# Patient Record
Sex: FEMALE | Race: WHITE | NOT HISPANIC OR LATINO | Employment: UNEMPLOYED | URBAN - METROPOLITAN AREA
[De-identification: names, ages, dates, MRNs, and addresses within clinical notes are randomized per-mention and may not be internally consistent; named-entity substitution may affect disease eponyms.]

---

## 2017-03-09 DIAGNOSIS — D12.6 ADENOMATOUS COLON POLYP: Primary | ICD-10-CM

## 2017-06-05 ENCOUNTER — ANESTHESIA (OUTPATIENT)
Dept: GASTROENTEROLOGY | Facility: HOSPITAL | Age: 57
End: 2017-06-05

## 2017-06-05 ENCOUNTER — HOSPITAL ENCOUNTER (OUTPATIENT)
Facility: HOSPITAL | Age: 57
Setting detail: HOSPITAL OUTPATIENT SURGERY
Discharge: HOME | End: 2017-06-05
Attending: INTERNAL MEDICINE | Admitting: INTERNAL MEDICINE

## 2017-06-05 ENCOUNTER — ANESTHESIA EVENT (OUTPATIENT)
Dept: GASTROENTEROLOGY | Facility: HOSPITAL | Age: 57
End: 2017-06-05

## 2017-06-05 VITALS
HEART RATE: 63 BPM | TEMPERATURE: 97.8 F | RESPIRATION RATE: 16 BRPM | DIASTOLIC BLOOD PRESSURE: 77 MMHG | BODY MASS INDEX: 18.79 KG/M2 | WEIGHT: 124 LBS | HEIGHT: 68 IN | OXYGEN SATURATION: 99 % | SYSTOLIC BLOOD PRESSURE: 125 MMHG

## 2017-06-05 DIAGNOSIS — D12.6 ADENOMATOUS COLON POLYP: ICD-10-CM

## 2017-06-05 PROCEDURE — 25010000002 PROPOFOL 10 MG/ML EMULSION: Performed by: ANESTHESIOLOGY

## 2017-06-05 PROCEDURE — 88305 TISSUE EXAM BY PATHOLOGIST: CPT | Performed by: INTERNAL MEDICINE

## 2017-06-05 PROCEDURE — S0260 H&P FOR SURGERY: HCPCS | Performed by: INTERNAL MEDICINE

## 2017-06-05 PROCEDURE — 45385 COLONOSCOPY W/LESION REMOVAL: CPT | Performed by: INTERNAL MEDICINE

## 2017-06-05 PROCEDURE — 45380 COLONOSCOPY AND BIOPSY: CPT | Performed by: INTERNAL MEDICINE

## 2017-06-05 RX ORDER — PROPOFOL 10 MG/ML
VIAL (ML) INTRAVENOUS AS NEEDED
Status: DISCONTINUED | OUTPATIENT
Start: 2017-06-05 | End: 2017-06-05 | Stop reason: SURG

## 2017-06-05 RX ORDER — LIDOCAINE HYDROCHLORIDE 20 MG/ML
INJECTION, SOLUTION INFILTRATION; PERINEURAL AS NEEDED
Status: DISCONTINUED | OUTPATIENT
Start: 2017-06-05 | End: 2017-06-05 | Stop reason: SURG

## 2017-06-05 RX ORDER — TOPIRAMATE 100 MG/1
50 TABLET, FILM COATED ORAL 2 TIMES DAILY
COMMUNITY
End: 2017-11-26

## 2017-06-05 RX ORDER — ESCITALOPRAM OXALATE 10 MG/1
30 TABLET ORAL DAILY
COMMUNITY

## 2017-06-05 RX ORDER — SODIUM CHLORIDE, SODIUM LACTATE, POTASSIUM CHLORIDE, CALCIUM CHLORIDE 600; 310; 30; 20 MG/100ML; MG/100ML; MG/100ML; MG/100ML
30 INJECTION, SOLUTION INTRAVENOUS CONTINUOUS PRN
Status: DISCONTINUED | OUTPATIENT
Start: 2017-06-05 | End: 2017-06-05 | Stop reason: HOSPADM

## 2017-06-05 RX ADMIN — LIDOCAINE HYDROCHLORIDE 100 MG: 20 INJECTION, SOLUTION INFILTRATION; PERINEURAL at 14:35

## 2017-06-05 RX ADMIN — PROPOFOL 200 MG: 10 INJECTION, EMULSION INTRAVENOUS at 14:35

## 2017-06-05 RX ADMIN — SODIUM CHLORIDE, POTASSIUM CHLORIDE, SODIUM LACTATE AND CALCIUM CHLORIDE 30 ML/HR: 600; 310; 30; 20 INJECTION, SOLUTION INTRAVENOUS at 13:48

## 2017-06-05 RX ADMIN — SODIUM CHLORIDE, POTASSIUM CHLORIDE, SODIUM LACTATE AND CALCIUM CHLORIDE: 600; 310; 30; 20 INJECTION, SOLUTION INTRAVENOUS at 14:30

## 2017-06-05 NOTE — PLAN OF CARE
Problem: Patient Care Overview (Adult)  Goal: Plan of Care Review  Outcome: Ongoing (interventions implemented as appropriate)    06/05/17 1323   Coping/Psychosocial Response Interventions   Plan Of Care Reviewed With patient       Goal: Adult Individualization and Mutuality  Outcome: Ongoing (interventions implemented as appropriate)  Goal: Discharge Needs Assessment  Outcome: Ongoing (interventions implemented as appropriate)    06/05/17 1323   Living Environment   Transportation Available car   Discharge Needs Assessment   Discharge Disposition home or self-care         Problem: GI Endoscopy (Adult)  Goal: Signs and Symptoms of Listed Potential Problems Will be Absent or Manageable (GI Endoscopy)  Outcome: Ongoing (interventions implemented as appropriate)

## 2017-06-05 NOTE — ANESTHESIA POSTPROCEDURE EVALUATION
Patient: Maggi De Santiago    Procedure Summary     Date Anesthesia Start Anesthesia Stop Room / Location    06/05/17 0363 [id number]  [facility]       Procedure Diagnosis Surgeon Provider    COLONOSCOPY TO CECUM AND TERMINAL ILEUM  WITH HOT AND COLD POLYPECTOMIES (N/A ) Adenomatous colon polyp  (Adenomatous colon polyp [D12.6]) MD Van Jensen MD          Anesthesia Type: MAC  Last vitals  BP      Temp      Pulse     Resp      SpO2        Post Anesthesia Care and Evaluation    Patient location during evaluation: PACU  Patient participation: complete - patient participated  Level of consciousness: awake and alert  Pain management: adequate  Airway patency: patent  Anesthetic complications: No anesthetic complications    Cardiovascular status: acceptable  Respiratory status: acceptable  Hydration status: acceptable

## 2017-06-05 NOTE — H&P
"[facility]  Pre Procedure History & Physical    Chief Complaint:   Hx adenomatous polyps, FH colon polyps    Subjective     HPI:   58 yo for surveillance c/s.  2 mm tubular adenoma removed from cecum in 2011. Her mother had colon polyps.  She denies any change in her bowel habits or blood in stool.    Past Medical History:   History reviewed. No pertinent past medical history.    Past Surgical History:  Past Surgical History:   Procedure Laterality Date   • BREAST BIOPSY Right    • MOUTH SURGERY      grafting   • TONSILLECTOMY         Family History:  History reviewed. No pertinent family history.    Social History:   reports that she has quit smoking. She does not have any smokeless tobacco history on file. She reports that she drinks alcohol.    Medications:   Prescriptions Prior to Admission   Medication Sig Dispense Refill Last Dose   • escitalopram (LEXAPRO) 10 MG tablet Take 30 mg by mouth Daily.   6/4/2017 at Unknown time   • Multiple Vitamin (MULTI VITAMIN PO) Take  by mouth Daily.   6/4/2017 at Unknown time   • Multiple Vitamins-Minerals (MULTIMINERAL PLUS PO) Take  by mouth Daily.   6/4/2017 at Unknown time   • topiramate (TOPAMAX) 100 MG tablet Take 50 mg by mouth 2 (Two) Times a Day.   6/5/2017 at Unknown time       Allergies:  Codeine    ROS:    Pertinent items are noted in HPI, all other systems reviewed and negative     Objective     Blood pressure 121/82, pulse 65, temperature 97.8 °F (36.6 °C), temperature source Oral, resp. rate 14, height 67.5\" (171.5 cm), weight 124 lb (56.2 kg), SpO2 99 %.    Physical Exam   Constitutional: Pt is oriented to person, place, and time and well-developed, well-nourished, and in no distress.   Mouth/Throat: Oropharynx is clear and moist.   Neck: Normal range of motion.   Cardiovascular: Normal rate, regular rhythm   Pulmonary/Chest: Effort normal   Abdominal: Soft. Nontender  Skin: Skin is warm and dry.   Psychiatric: Mood, memory, affect and " judgment normal.     Assessment/Plan     Diagnosis:  Hx adenomatous polyps, FH colon polyps    Anticipated Surgical Procedure:  colonoscopy    The risks, benefits, and alternatives of this procedure have been discussed with the patient or the responsible party- the patient understands and agrees to proceed.

## 2017-06-05 NOTE — ANESTHESIA PREPROCEDURE EVALUATION
Anesthesia Evaluation     Patient summary reviewed and Nursing notes reviewed   NPO Solid Status: > 8 hours  NPO Liquid Status: > 8 hours     Airway   Mallampati: II  TM distance: >3 FB  Neck ROM: full  no difficulty expected  Dental - normal exam     Pulmonary - normal exam   (+) a smoker Former,   Cardiovascular - negative cardio ROS and normal exam        Neuro/Psych- negative ROS  GI/Hepatic/Renal/Endo - negative ROS     Musculoskeletal (-) negative ROS    Abdominal  - normal exam   Substance History - negative use     OB/GYN          Other - negative ROS                                       Anesthesia Plan    ASA 2     MAC     intravenous induction   Anesthetic plan and risks discussed with patient.

## 2017-06-06 LAB
CYTO UR: NORMAL
LAB AP CASE REPORT: NORMAL
Lab: NORMAL
PATH REPORT.FINAL DX SPEC: NORMAL
PATH REPORT.GROSS SPEC: NORMAL

## 2017-06-14 ENCOUNTER — TELEPHONE (OUTPATIENT)
Dept: GASTROENTEROLOGY | Facility: CLINIC | Age: 57
End: 2017-06-14

## 2017-06-14 NOTE — TELEPHONE ENCOUNTER
Called pt and advised per Dr Bird that one of the polyps showed adenomatous change.  This is not cancerous but is considered potentially precancerous and she recommends a repeat c/s in 5 yrs.  Pt verb understanding.     C/s placed in recall for 06/05/2022.

## 2017-11-26 ENCOUNTER — APPOINTMENT (OUTPATIENT)
Dept: GENERAL RADIOLOGY | Facility: HOSPITAL | Age: 57
End: 2017-11-26

## 2017-11-26 ENCOUNTER — HOSPITAL ENCOUNTER (EMERGENCY)
Facility: HOSPITAL | Age: 57
Discharge: HOME | End: 2017-11-26
Attending: EMERGENCY MEDICINE | Admitting: EMERGENCY MEDICINE

## 2017-11-26 VITALS
SYSTOLIC BLOOD PRESSURE: 136 MMHG | WEIGHT: 130 LBS | HEIGHT: 67 IN | DIASTOLIC BLOOD PRESSURE: 91 MMHG | RESPIRATION RATE: 16 BRPM | HEART RATE: 80 BPM | TEMPERATURE: 98.6 F | BODY MASS INDEX: 20.4 KG/M2 | OXYGEN SATURATION: 100 %

## 2017-11-26 DIAGNOSIS — S52.124A CLOSED NONDISPLACED FRACTURE OF HEAD OF RIGHT RADIUS, INITIAL ENCOUNTER: Primary | ICD-10-CM
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
## 2017-11-26 DIAGNOSIS — S52.561A CLOSED VOLAR BARTON'S FRACTURE OF RIGHT RADIUS, INITIAL ENCOUNTER: ICD-10-CM

## 2017-11-26 PROCEDURE — 73090 X-RAY EXAM OF FOREARM: CPT

## 2017-11-26 PROCEDURE — 73130 X-RAY EXAM OF HAND: CPT

## 2017-11-26 PROCEDURE — 73070 X-RAY EXAM OF ELBOW: CPT

## 2017-11-26 PROCEDURE — 99282 EMERGENCY DEPT VISIT SF MDM: CPT

## 2017-11-26 PROCEDURE — 73110 X-RAY EXAM OF WRIST: CPT

## 2017-11-26 RX ORDER — ONDANSETRON 2 MG/ML
4 INJECTION INTRAMUSCULAR; INTRAVENOUS ONCE
Status: DISCONTINUED | OUTPATIENT
Start: 2017-11-26 | End: 2017-11-26

## 2017-11-26 RX ORDER — HYDROCODONE BITARTRATE AND ACETAMINOPHEN 5; 325 MG/1; MG/1
1 TABLET ORAL EVERY 6 HOURS PRN
Qty: 12 TABLET | Refills: 0 | Status: SHIPPED | OUTPATIENT
Start: 2017-11-26

## 2017-11-26 RX ORDER — ONDANSETRON 4 MG/1
4 TABLET, FILM COATED ORAL EVERY 6 HOURS
Qty: 8 TABLET | Refills: 0 | Status: SHIPPED | OUTPATIENT
Start: 2017-11-26

## 2017-11-26 NOTE — ED PROVIDER NOTES
"EMERGENCY DEPARTMENT ENCOUNTER    Room Number:  08/08  Date seen:  11/26/2017  Time seen: 5:57 PM  PCP: Thelma Mcintosh MD    HPI:  Chief complaint: R arm pain  Context:Maggi De Santiago is a 57 y.o. female who presents to the ED with c/o R arm pain s/p trip and fall onto the sidewalk just PTA. Pt also c/o R hand pain and abrasions. Pt denies head injury, LOC, dizziness, or lightheadedness.     Timing: constant   Duration: s/p fall earlier today   Location: R arm   Radiation: none stated  Quality: pain   Intensity/Severity: moderate   Associated Symptoms: R hand pain, R hand abrasion  Aggravating Factors: none stated   Alleviating Factors: none stated   Previous Episodes: none   Treatment before arrival: none     MEDICAL RECORD REVIEW    ALLERGIES  Codeine    PAST MEDICAL HISTORY  Active Ambulatory Problems     Diagnosis Date Noted   • No Active Ambulatory Problems     Resolved Ambulatory Problems     Diagnosis Date Noted   • No Resolved Ambulatory Problems     Past Medical History:   Diagnosis Date   • Depression        PAST SURGICAL HISTORY  Past Surgical History:   Procedure Laterality Date   • BREAST BIOPSY Right    • COLONOSCOPY N/A 6/5/2017    Procedure: COLONOSCOPY TO CECUM AND TERMINAL ILEUM  WITH HOT AND COLD POLYPECTOMIES;  Surgeon: Lashell Bird MD;  Location: [facility];  Service:    • MOUTH SURGERY      grafting   • TONSILLECTOMY         FAMILY HISTORY  History reviewed. No pertinent family history.    SOCIAL HISTORY  Social History     Social History   • Marital status:      Spouse name: N/A   • Number of children: N/A   • Years of education: N/A     Occupational History   • Not on file.     Social History Main Topics   • Smoking status: Former Smoker   • Smokeless tobacco: Not on file      Comment: \"in college\"   • Alcohol use Yes      Comment: daily   • Drug use: No   • Sexual activity: Defer     Other Topics Concern   • Not on file     Social History Narrative   • No narrative on " file       REVIEW OF SYSTEMS  Review of Systems   Constitutional: Negative for activity change, appetite change, diaphoresis and fever.   HENT: Negative for trouble swallowing.    Eyes: Negative for visual disturbance.   Respiratory: Negative for cough, chest tightness, shortness of breath and wheezing.    Cardiovascular: Negative for chest pain, palpitations and leg swelling.   Gastrointestinal: Negative for abdominal pain, diarrhea, nausea and vomiting.   Genitourinary: Negative for dysuria.   Musculoskeletal: Positive for arthralgias (R arm, R hand). Negative for back pain.   Skin: Positive for wound (abrasion, R hand). Negative for rash.   Neurological: Negative for dizziness, speech difficulty and light-headedness.       PHYSICAL EXAM  ED Triage Vitals   Temp Heart Rate Resp BP SpO2   11/26/17 1626 11/26/17 1626 11/26/17 1641 11/26/17 1641 11/26/17 1626   98.6 °F (37 °C) 80 16 156/97 100 %      Temp src Heart Rate Source Patient Position BP Location FiO2 (%)   11/26/17 1626 11/26/17 1626 11/26/17 1641 11/26/17 1641 --   Tympanic Monitor Lying Right arm      Physical Exam   Constitutional: She is oriented to person, place, and time and well-developed, well-nourished, and in no distress. No distress.   HENT:   Head: Normocephalic and atraumatic.   Mouth/Throat: Uvula is midline and mucous membranes are normal.   Neck: Normal range of motion. Neck supple.   Cardiovascular: Normal rate, regular rhythm, S1 normal, S2 normal and normal heart sounds.  Exam reveals no gallop and no friction rub.    No murmur heard.  Pulses:       Radial pulses are 2+ on the right side, and 2+ on the left side.   Brisk capillary refill   Pulmonary/Chest: Effort normal and breath sounds normal. She has no decreased breath sounds. She has no wheezes. She has no rhonchi. She has no rales.   Abdominal: Soft. Normal appearance. There is no rebound and no guarding.   Musculoskeletal: Normal range of motion.   Pt cannot fully extend R elbow.  No clicking or locking.   Neurological: She is alert and oriented to person, place, and time.   Skin: Skin is warm and dry. Abrasion (scattered, dorsum of R hand) noted.   Psychiatric: Affect and judgment normal.   Nursing note and vitals reviewed.    I ordered the above labs and reviewed the results    RADIOLOGY  XR Elbow 2 View Right   Final Result   AP and lateral view of the right elbow demonstrates a sail  sign. A mildly comminuted radial head fracture is identified with  approximately 3 mm in maximum displacement.   XR Wrist 3+ View Right   Final Result   Four views of the right wrist show no evidence of fracture  or dislocation.   XR Forearm 2 View Right   Final Result   Two views of the right forearm demonstrates a proximal  radial head fracture. No other fracture is identified.   XR Hand 3+ View Right   Preliminary Result   Subtle fracture of the volar distal radius cannot be excluded. Soft   tissue swelling is present.    If indicated CT scan may be performed.              I ordered the above noted radiological studies and reviewed the images on the PACS system.    MEDICATIONS GIVEN IN ER  Medications - No data to display    PROCEDURES  Procedures    COURSE & MEDICAL DECISION MAKING  Pertinent Imaging studies that were ordered and reviewed are noted above.  Results were reviewed/discussed with the patient and they were also made aware of online assess.     PROGRESS AND CONSULTS    Progress Notes:    ED Course   1831  Per the RN, the pt does not want pain medication.     1840  Rechecked pt, who is resting comfortably. Discussed the pt's XR findings of a radial head fracture. Plan to d/c the pt with meds for pain and nausea. Pt understands and agrees with the plan, and all questions were answered.     1856  Reviewed pt's history and workup with Dr. Montanez.  After a bedside evaluation; Dr. Montanez agrees with the plan of care.    1931  Rechecked pt, who is resting comfortably. Discussed the pt's XR results  "including a subtle volar distal radius fracture. Plan to d/c the pt in a wrist splint, sling, and swath, with pain meds, and a f/u with an orthopedist or hand surgeon. Pt understands and agrees with the plan, and all questions were answered.     1934  The patient's history, physical exam, and lab findings were discussed with the physician, who also performed a face to face history and physical exam.  I discussed all results and noted any abnormalities with patient.  Discussed absoute need to recheck abnormalities with their family physician.  I answered any of the patient's questions.  Discussed plan for discharge, as there is no emergent indication for admission.  Pt is agreeable and understands need for follow up and repeat testing.  Pt is aware that discharge does not mean that nothing is wrong but it indicates no emergency is present and they must continue care with their family physician.  Pt is discharged with instructions to follow up with primary care doctor to have their blood pressure rechecked.       Disposition vitals:  /97 (BP Location: Right arm, Patient Position: Lying)  Pulse 80  Temp 98.6 °F (37 °C) (Tympanic)   Resp 16  Ht 67\" (170.2 cm)  Wt 130 lb (59 kg)  SpO2 100%  BMI 20.36 kg/m2      DIAGNOSIS  Final diagnoses:   Closed nondisplaced fracture of head of right radius, initial encounter   Closed volar Hwang's fracture of right radius, initial encounter   Fall (on)(from) sidewalk curb, initial encounter       FOLLOW UP   Orlando Lewis MD  [address]  [place] [address]  [phone]    Schedule an appointment as soon as possible for a visit      [facility] ARM AND HAND [facility]  [address]  [facility] [id number] [phone]  Schedule an appointment as soon as possible for a visit  your choice      RX     Medication List      New Prescriptions          HYDROcodone-acetaminophen 5-325 MG per tablet   Commonly known as:  NORCO   Take 1 " tablet by mouth Every 6 (Six) Hours As Needed for Moderate Pain .       ondansetron 4 MG tablet   Commonly known as:  ZOFRAN   Take 1 tablet by mouth Every 6 (Six) Hours.           Andrei Report  Andrei report [id number] reviewed.   After examining the available clinical information and risks of prescribing controlled substances (including non-treatment or other adjunct treatments), it is considered medically appropriate that a controlled medication be prescribed.  I discussed risks/benefits/alternatives of using a controlled substance including risk of tolerance and dependence.  I discussed with patient (and family if applicable) that the patient should not drive, operate machinery, or otherwise engage in risky behavior as this medication may impair judgment and/or motor capabilities. I discussed that the patient will receive only a short-term controlled substance prescription for management of acute symptoms and that any additional medication needs should be addressed by the primary care provider or appropriate specialist.    Documentation assistance provided by varghese Du for JEANETTE Staton.  Information recorded by the varghese was done at my direction and has been verified and validated by me.  Electronically signed by Terry Du on 11/26/2017 at time 7:39 PM       Terry Du  11/26/17 1939       JEANETTE Jimenez  11/26/17 2027

## 2017-11-27 NOTE — ED NOTES
Pt discharged with discharge instructions and follow up appointment suggested. Pt alert and oriented x4, ambulatory, pt in no acute distress. Pt had no questions or complaints at this time. Pt was placed on Velcro cock wrist splint and shoulder immobilizer.     Renata Neumann RN  11/26/17 2010

## 2017-11-27 NOTE — DISCHARGE INSTRUCTIONS
Rest, Ice, Elevate    Call tomorrow for follow-up appointment     YOU HAVE BEEN PRESCRIBED NARCOTIC PAIN MEDICATION    Narcotic pain medications can be addicting; only take them when needed    You have only been given a 2-3 day supply    Do not operate heavy machinery or make important decisions while taking narcotics    Take an over the counters tool softener while taking pain pills to avoid constipation    Do not consume alcohol while taking pain medication as this can be fatal    Do not share your pain medication with others    Store pain medication securely to prevent accidental exposure or death      Return Precautions    Although you are being discharged from the ED today, I encourage you to return for worsening symptoms.  Things can, and do, change such that treatment at home with medication may not be adequate.      Specifically, return for any of the following:    Chest pain, shortness of breath, pain or nausea and vomiting not controlled by medications provided.    Please make a follow up with your Primary Care Provider for a blood pressure recheck.

## 2017-11-27 NOTE — ED PROVIDER NOTES
The pt c/o a fall which occurred earlier today. The pt states that she tripped and fell, landing on her right arm. The pt c/o R elbow pain and right  hand pain and abrasion. The pt denies injury to head or LOC. The pt has no other complaints at this time.     PEx  Alert, oriented x3  No discomfort in the pt's R shoulder.  The pt has pain with pronation supination of the R arm at the elbow,   The pt has pain to the Dorsal aspect of her R hand. She is able to move her finger. She has mild pain with movement of wrist. No bony deformity present. Strong and equal distal pulses.       Reviewed XR which showed a fracture of the right radial head. Agree with plan to splint R wrist with a sling and swath. Agree with plan to discharge pt with pain medication and f/o with ortho.     I supervised care provided by the midlevel provider.    We have discussed this patient's history, physical exam, and treatment plan.   I have reviewed the note and personally saw and examined the patient and agree with the plan of care.    Documentation assistance provided by varghese De La Garza for Dr. Montanez.  Information recorded by the scribe was done at my direction and has been verified and validated by me.       Sarina De La Garza  11/26/17 1928       Sylvain Montanez MD  11/26/17 1944

## 2019-09-23 NOTE — TELEPHONE ENCOUNTER
----- Message from Lashell Bird MD sent at 6/9/2017  2:09 PM EDT -----  One of the polyp(s) biopsies showed adenomatous change. This is not cancerous but is considered potentially precancerous. Follow-up colonoscopy in 5 years is advised.      What Type Of Note Output Would You Prefer (Optional)?: Standard Output How Severe Is Your Acne?: moderate Is This A New Presentation, Or A Follow-Up?: Acne Additional Comments (Use Complete Sentences): Patient has used topicals in past but unsure of which ones

## 2020-08-20 ENCOUNTER — TELEPHONE (OUTPATIENT)
Dept: GASTROENTEROLOGY | Facility: CLINIC | Age: 60
End: 2020-08-20

## 2020-08-20 NOTE — TELEPHONE ENCOUNTER
----- Message from Alma Patino sent at 8/19/2020  3:50 PM EDT -----  Regarding: FW: Colonoscopy  Contact: [phone]   Laura Anderson can you taker care of this?  Thanks!  ----- Message -----  From: Maggi Noel  Sent: 8/19/2020   2:09 PM EDT  To: [facility]  Subject: Colonoscopy                                      Patient received letter for a f/u colonoscopy.  She says it has only been 3 years and usually does 5 years.

## 2020-08-20 NOTE — TELEPHONE ENCOUNTER
Spoke with patient and told her that according to our records her last colonoscopy with Dr Bird was in 2017.  Dr Bird recommends that she have a repeat c/s in 5 years(6/2022) unless she has a change in bowel habits or rectal bleeding, etc. Voiced understanding.  Patient said that she was the one that asked for the OA paperwork because her [facility] MYChart said that she was due in 3 years and she just wanted to clarify.

## 2021-03-30 ENCOUNTER — BULK ORDERING (OUTPATIENT)
Dept: CASE MANAGEMENT | Facility: OTHER | Age: 61
End: 2021-03-30

## 2021-03-30 DIAGNOSIS — Z23 IMMUNIZATION DUE: ICD-10-CM

## 2022-02-14 ENCOUNTER — PRE-PROCEDURE SCREENING (OUTPATIENT)
Dept: GASTROENTEROLOGY | Facility: CLINIC | Age: 62
End: 2022-02-14

## 2022-03-04 ENCOUNTER — PREP FOR SURGERY (OUTPATIENT)
Dept: OTHER | Facility: HOSPITAL | Age: 62
End: 2022-03-04

## 2022-03-04 DIAGNOSIS — D12.6 ADENOMATOUS COLON POLYP: Primary | ICD-10-CM

## 2022-03-18 PROBLEM — D12.6 ADENOMATOUS COLON POLYP: Status: ACTIVE | Noted: 2022-03-18

## 2022-05-20 RX ORDER — MELATONIN
1000 DAILY
COMMUNITY
Start: 2016-02-02

## 2022-05-23 ENCOUNTER — HOSPITAL ENCOUNTER (OUTPATIENT)
Facility: HOSPITAL | Age: 62
Setting detail: HOSPITAL OUTPATIENT SURGERY
Discharge: HOME | End: 2022-05-23
Attending: INTERNAL MEDICINE | Admitting: INTERNAL MEDICINE

## 2022-05-23 ENCOUNTER — ANESTHESIA (OUTPATIENT)
Dept: GASTROENTEROLOGY | Facility: HOSPITAL | Age: 62
End: 2022-05-23

## 2022-05-23 ENCOUNTER — ANESTHESIA EVENT (OUTPATIENT)
Dept: GASTROENTEROLOGY | Facility: HOSPITAL | Age: 62
End: 2022-05-23

## 2022-05-23 VITALS
OXYGEN SATURATION: 100 % | RESPIRATION RATE: 16 BRPM | SYSTOLIC BLOOD PRESSURE: 111 MMHG | BODY MASS INDEX: 21.53 KG/M2 | HEIGHT: 66 IN | HEART RATE: 67 BPM | DIASTOLIC BLOOD PRESSURE: 69 MMHG | WEIGHT: 134 LBS

## 2022-05-23 DIAGNOSIS — D12.6 ADENOMATOUS COLON POLYP: ICD-10-CM

## 2022-05-23 PROCEDURE — S0260 H&P FOR SURGERY: HCPCS | Performed by: INTERNAL MEDICINE

## 2022-05-23 PROCEDURE — 25010000002 PROPOFOL 10 MG/ML EMULSION: Performed by: ANESTHESIOLOGY

## 2022-05-23 PROCEDURE — 45385 COLONOSCOPY W/LESION REMOVAL: CPT | Performed by: INTERNAL MEDICINE

## 2022-05-23 PROCEDURE — 88305 TISSUE EXAM BY PATHOLOGIST: CPT | Performed by: INTERNAL MEDICINE

## 2022-05-23 RX ORDER — PROPOFOL 10 MG/ML
VIAL (ML) INTRAVENOUS CONTINUOUS PRN
Status: DISCONTINUED | OUTPATIENT
Start: 2022-05-23 | End: 2022-05-23 | Stop reason: SURG

## 2022-05-23 RX ORDER — PROPOFOL 10 MG/ML
VIAL (ML) INTRAVENOUS AS NEEDED
Status: DISCONTINUED | OUTPATIENT
Start: 2022-05-23 | End: 2022-05-23 | Stop reason: SURG

## 2022-05-23 RX ORDER — LIDOCAINE HYDROCHLORIDE 20 MG/ML
INJECTION, SOLUTION INFILTRATION; PERINEURAL AS NEEDED
Status: DISCONTINUED | OUTPATIENT
Start: 2022-05-23 | End: 2022-05-23 | Stop reason: SURG

## 2022-05-23 RX ORDER — SODIUM CHLORIDE, SODIUM LACTATE, POTASSIUM CHLORIDE, CALCIUM CHLORIDE 600; 310; 30; 20 MG/100ML; MG/100ML; MG/100ML; MG/100ML
INJECTION, SOLUTION INTRAVENOUS CONTINUOUS PRN
Status: DISCONTINUED | OUTPATIENT
Start: 2022-05-23 | End: 2022-05-23 | Stop reason: SURG

## 2022-05-23 RX ORDER — SODIUM CHLORIDE, SODIUM LACTATE, POTASSIUM CHLORIDE, CALCIUM CHLORIDE 600; 310; 30; 20 MG/100ML; MG/100ML; MG/100ML; MG/100ML
30 INJECTION, SOLUTION INTRAVENOUS CONTINUOUS PRN
Status: DISCONTINUED | OUTPATIENT
Start: 2022-05-23 | End: 2022-05-23 | Stop reason: HOSPADM

## 2022-05-23 RX ADMIN — SODIUM CHLORIDE, POTASSIUM CHLORIDE, SODIUM LACTATE AND CALCIUM CHLORIDE: 600; 310; 30; 20 INJECTION, SOLUTION INTRAVENOUS at 15:08

## 2022-05-23 RX ADMIN — LIDOCAINE HYDROCHLORIDE 60 MG: 20 INJECTION, SOLUTION INFILTRATION; PERINEURAL at 15:11

## 2022-05-23 RX ADMIN — SODIUM CHLORIDE, POTASSIUM CHLORIDE, SODIUM LACTATE AND CALCIUM CHLORIDE 30 ML/HR: 600; 310; 30; 20 INJECTION, SOLUTION INTRAVENOUS at 14:50

## 2022-05-23 RX ADMIN — Medication 125 MCG/KG/MIN: at 15:12

## 2022-05-23 RX ADMIN — PROPOFOL 75 MG: 10 INJECTION, EMULSION INTRAVENOUS at 15:12

## 2022-05-23 NOTE — H&P
[facility]  Pre Procedure History & Physical    Chief Complaint:   Personal history of polyps    Subjective     HPI:   61yo here today for colonoscopy.  Pt reports no known fh crc/polyps but her father recently passed form some process that involved the colon but she does not have a good understanding of his diagnosis.  Patient denies GI symptoms currrently.  Last exam 2017.  Since her last exam she has had her first episode of diverticulitis    Past Medical History:   Past Medical History:   Diagnosis Date   • Adenomatous colon polyp    • Depression        Past Surgical History:  Past Surgical History:   Procedure Laterality Date   • BREAST BIOPSY Right    • COLONOSCOPY N/A 6/5/2017    Procedure: COLONOSCOPY TO CECUM AND TERMINAL ILEUM  WITH HOT AND COLD POLYPECTOMIES;  Surgeon: Lashell Bird MD;  Location: [facility];  Service:    • MOUTH SURGERY      grafting   • TONSILLECTOMY         Family History:  Family History   Problem Relation Age of Onset   • Malig Hyperthermia Neg Hx        Social History:   reports that she has quit smoking. She has never used smokeless tobacco. She reports current alcohol use. She reports that she does not use drugs.    Medications:   Medications Prior to Admission   Medication Sig Dispense Refill Last Dose   • cholecalciferol (VITAMIN D3) 25 MCG (1000 UT) tablet Take 1,000 Units by mouth Daily.   5/22/2022 at Unknown time   • escitalopram (LEXAPRO) 10 MG tablet Take 30 mg by mouth Daily.   5/22/2022 at Unknown time   • HYDROXYZINE HCL PO Take 50 mg by mouth Every Night.   5/21/2022   • ciprofloxacin (CIPRO) 500 MG tablet 1 po q12 10 tablet 0 More than a month at Unknown time   • HYDROcodone-acetaminophen (NORCO) 5-325 MG per tablet Take 1 tablet by mouth Every 6 (Six) Hours As Needed for Moderate Pain . 12 tablet 0 More than a month at Unknown time   • Multiple Vitamin (MULTI VITAMIN PO) Take  by mouth Daily.      • Multiple Vitamins-Minerals  "(MULTIMINERAL PLUS PO) Take  by mouth Daily.      • ondansetron (ZOFRAN) 4 MG tablet Take 1 tablet by mouth Every 6 (Six) Hours. 8 tablet 0 More than a month at Unknown time       Allergies:  Codeine and Venlafaxine    ROS:    Pertinent items are noted in HPI, all other systems reviewed and negative     Objective     Blood pressure 141/86, pulse 86, resp. rate 28, height 167.6 cm (66\"), weight 60.8 kg (134 lb), SpO2 99 %.    Physical Exam   Constitutional: Pt is oriented to person, place, and time and well-developed, well-nourished, and in no distress.   Mouth/Throat: Oropharynx is clear and moist.   Neck: Normal range of motion.   Cardiovascular: Normal rate, regular rhythm    Pulmonary/Chest: Effort normal    Abdominal: Soft. Nontender  Skin: Skin is warm and dry.   Psychiatric: Mood, memory, affect and judgment normal.     Assessment & Plan     Diagnosis:  H/o colon polyps    Anticipated Surgical Procedure:  colonoscopy    The risks, benefits, and alternatives of this procedure have been discussed with the patient or the responsible party- the patient understands and agrees to proceed.                                                              "

## 2022-05-23 NOTE — ANESTHESIA PREPROCEDURE EVALUATION
Anesthesia Evaluation     Patient summary reviewed                Airway   No difficulty expected  Dental      Pulmonary    Cardiovascular     Rhythm: regular        Neuro/Psych  (+) psychiatric history Depression,    GI/Hepatic/Renal/Endo      ROS Comment: Hx polyps    Musculoskeletal     Abdominal    Substance History      OB/GYN          Other                        Anesthesia Plan    ASA 2     MAC       Anesthetic plan, all risks, benefits, and alternatives have been provided, discussed and informed consent has been obtained with: patient.        CODE STATUS:

## 2022-05-23 NOTE — DISCHARGE INSTRUCTIONS

## 2022-05-25 ENCOUNTER — TELEPHONE (OUTPATIENT)
Dept: GASTROENTEROLOGY | Facility: CLINIC | Age: 62
End: 2022-05-25

## 2022-05-25 LAB
LAB AP CASE REPORT: NORMAL
PATH REPORT.FINAL DX SPEC: NORMAL
PATH REPORT.GROSS SPEC: NORMAL

## 2022-05-25 NOTE — ANESTHESIA POSTPROCEDURE EVALUATION
"Patient: Maggi De Santiago    Procedure Summary     Date: 05/23/22 Room / [facility]    Anesthesia Start: 1508 Anesthesia Stop: 1535    Procedure: COLONOSCOPY INTO CECUM AND T.I. WITH COLD SNARE/BIOPSIES POLYPECTOMIES (N/A ) Diagnosis:       Adenomatous colon polyp      (Adenomatous colon polyp [D12.6])    Surgeons: Lashell Bird MD Provider: Amadou Li MD    Anesthesia Type: MAC ASA Status: 2          Anesthesia Type: MAC    Vitals  Vitals Value Taken Time   /69 05/23/22 1554   Temp     Pulse 67 05/23/22 1554   Resp 16 05/23/22 1554   SpO2 100 % 05/23/22 1554           Post Anesthesia Care and Evaluation    Patient location during evaluation: bedside  Patient participation: complete - patient participated  Level of consciousness: awake  Pain management: adequate  Airway patency: patent  Anesthetic complications: No anesthetic complications    Cardiovascular status: acceptable  Respiratory status: acceptable  Hydration status: acceptable    Comments: /69 (BP Location: Left arm, Patient Position: Lying)   Pulse 67   Resp 16   Ht 167.6 cm (66\")   Wt 60.8 kg (134 lb)   SpO2 100%   BMI 21.63 kg/m²       "

## 2022-05-25 NOTE — TELEPHONE ENCOUNTER
Called pt and advised of Dr Bird's note.Verb understanding.     C/s placed in recall and hm for 05/23/2027.

## 2022-05-25 NOTE — TELEPHONE ENCOUNTER
----- Message from Lashell Bird MD sent at 5/25/2022 12:48 PM EDT -----  The polyp(s) biopsies showed adenomatous change. This is not cancerous but is considered potentially precancerous. Follow-up colonoscopy in 5 years is advised.

## (undated) DEVICE — SINGLE-USE BIOPSY FORCEPS: Brand: RADIAL JAW 4

## (undated) DEVICE — TUBING, SUCTION, 1/4" X 10', STRAIGHT: Brand: MEDLINE

## (undated) DEVICE — SENSR O2 OXIMAX FNGR A/ 18IN NONSTR

## (undated) DEVICE — SNAR POLYP CAPTIVATOR RND STFF 2.4 240CM 10MM 1P/U

## (undated) DEVICE — ADAPT CLN BIOGUARD AIR/H2O DISP

## (undated) DEVICE — THE SINGLE USE ETRAP – POLYP TRAP IS USED FOR SUCTION RETRIEVAL OF ENDOSCOPICALLY REMOVED POLYPS.: Brand: ETRAP

## (undated) DEVICE — LN SMPL CO2 SHTRM SD STREAM W/M LUER

## (undated) DEVICE — CANN O2 ETCO2 FITS ALL CONN CO2 SMPL A/ 7IN DISP LF

## (undated) DEVICE — Device: Brand: DEFENDO AIR/WATER/SUCTION AND BIOPSY VALVE

## (undated) DEVICE — KT ORCA ORCAPOD DISP STRL

## (undated) DEVICE — SNAR POLYP SENSATION STDOVL 27 240 BX40

## (undated) DEVICE — CANN NASL CO2 TRULINK W/O2 A/

## (undated) DEVICE — THE TORRENT IRRIGATION SCOPE CONNECTOR IS USED WITH THE TORRENT IRRIGATION TUBING TO PROVIDE IRRIGATION FLUIDS SUCH AS STERILE WATER DURING GASTROINTESTINAL ENDOSCOPIC PROCEDURES WHEN USED IN CONJUNCTION WITH AN IRRIGATION PUMP (OR ELECTROSURGICAL UNIT).: Brand: TORRENT